# Patient Record
Sex: FEMALE | Race: WHITE | NOT HISPANIC OR LATINO | Employment: FULL TIME | URBAN - METROPOLITAN AREA
[De-identification: names, ages, dates, MRNs, and addresses within clinical notes are randomized per-mention and may not be internally consistent; named-entity substitution may affect disease eponyms.]

---

## 2017-05-05 ENCOUNTER — OFFICE VISIT (OUTPATIENT)
Dept: GYNECOLOGIC ONCOLOGY | Facility: CLINIC | Age: 37
End: 2017-05-05

## 2017-05-05 VITALS
HEART RATE: 50 BPM | RESPIRATION RATE: 16 BRPM | OXYGEN SATURATION: 98 % | WEIGHT: 152 LBS | SYSTOLIC BLOOD PRESSURE: 105 MMHG | DIASTOLIC BLOOD PRESSURE: 60 MMHG | TEMPERATURE: 98.8 F

## 2017-05-05 DIAGNOSIS — Z01.419 WOMEN'S ANNUAL ROUTINE GYNECOLOGICAL EXAMINATION: ICD-10-CM

## 2017-05-05 DIAGNOSIS — Z85.41 HX OF CERVICAL CANCER: Primary | ICD-10-CM

## 2017-05-05 DIAGNOSIS — N95.8 ARTIFICIAL MENOPAUSE: ICD-10-CM

## 2017-05-05 DIAGNOSIS — C53.9 CERVICAL CANCER, FIGO STAGE IA1 (HCC): ICD-10-CM

## 2017-05-05 PROCEDURE — 99214 OFFICE O/P EST MOD 30 MIN: CPT | Performed by: OBSTETRICS & GYNECOLOGY

## 2017-05-11 ENCOUNTER — TELEPHONE (OUTPATIENT)
Dept: GYNECOLOGIC ONCOLOGY | Facility: CLINIC | Age: 37
End: 2017-05-11

## 2017-05-31 ENCOUNTER — TRANSCRIBE ORDERS (OUTPATIENT)
Dept: LAB | Facility: HOSPITAL | Age: 37
End: 2017-05-31

## 2017-05-31 ENCOUNTER — LAB (OUTPATIENT)
Dept: LAB | Facility: HOSPITAL | Age: 37
End: 2017-05-31

## 2017-05-31 DIAGNOSIS — Z01.812 PRE-OPERATIVE LABORATORY EXAMINATION: Primary | ICD-10-CM

## 2017-05-31 DIAGNOSIS — Z01.812 PRE-OPERATIVE LABORATORY EXAMINATION: ICD-10-CM

## 2017-05-31 LAB
ALBUMIN SERPL-MCNC: 4.8 G/DL (ref 3.2–4.8)
ALBUMIN/GLOB SERPL: 1.9 G/DL (ref 1.5–2.5)
ALP SERPL-CCNC: 102 U/L (ref 25–100)
ALT SERPL W P-5'-P-CCNC: 27 U/L (ref 7–40)
ANION GAP SERPL CALCULATED.3IONS-SCNC: 5 MMOL/L (ref 3–11)
AST SERPL-CCNC: 33 U/L (ref 0–33)
BILIRUB SERPL-MCNC: 1.2 MG/DL (ref 0.3–1.2)
BUN BLD-MCNC: 19 MG/DL (ref 9–23)
BUN/CREAT SERPL: 21.1 (ref 7–25)
CALCIUM SPEC-SCNC: 10.3 MG/DL (ref 8.7–10.4)
CHLORIDE SERPL-SCNC: 103 MMOL/L (ref 99–109)
CO2 SERPL-SCNC: 34 MMOL/L (ref 20–31)
CREAT BLD-MCNC: 0.9 MG/DL (ref 0.6–1.3)
DEPRECATED RDW RBC AUTO: 46.7 FL (ref 37–54)
ERYTHROCYTE [DISTWIDTH] IN BLOOD BY AUTOMATED COUNT: 12.7 % (ref 11.3–14.5)
GFR SERPL CREATININE-BSD FRML MDRD: 70 ML/MIN/1.73
GLOBULIN UR ELPH-MCNC: 2.5 GM/DL
GLUCOSE BLD-MCNC: 87 MG/DL (ref 70–100)
HCT VFR BLD AUTO: 41.9 % (ref 34.5–44)
HGB BLD-MCNC: 13.6 G/DL (ref 11.5–15.5)
LIPASE SERPL-CCNC: 51 U/L (ref 6–51)
MCH RBC QN AUTO: 32.5 PG (ref 27–31)
MCHC RBC AUTO-ENTMCNC: 32.5 G/DL (ref 32–36)
MCV RBC AUTO: 100.2 FL (ref 80–99)
PLATELET # BLD AUTO: 166 10*3/MM3 (ref 150–450)
PMV BLD AUTO: 8.9 FL (ref 6–12)
POTASSIUM BLD-SCNC: 3.8 MMOL/L (ref 3.5–5.5)
PROT SERPL-MCNC: 7.3 G/DL (ref 5.7–8.2)
RBC # BLD AUTO: 4.18 10*6/MM3 (ref 3.89–5.14)
SODIUM BLD-SCNC: 142 MMOL/L (ref 132–146)
WBC NRBC COR # BLD: 6.13 10*3/MM3 (ref 3.5–10.8)

## 2017-05-31 PROCEDURE — 83690 ASSAY OF LIPASE: CPT

## 2017-05-31 PROCEDURE — 80053 COMPREHEN METABOLIC PANEL: CPT

## 2017-05-31 PROCEDURE — 36415 COLL VENOUS BLD VENIPUNCTURE: CPT

## 2017-05-31 PROCEDURE — 85027 COMPLETE CBC AUTOMATED: CPT

## 2017-06-05 ENCOUNTER — LAB REQUISITION (OUTPATIENT)
Dept: LAB | Facility: HOSPITAL | Age: 37
End: 2017-06-05

## 2017-06-05 DIAGNOSIS — K80.20 CALCULUS OF GALLBLADDER WITHOUT CHOLECYSTITIS WITHOUT OBSTRUCTION: ICD-10-CM

## 2017-06-05 PROCEDURE — 88304 TISSUE EXAM BY PATHOLOGIST: CPT | Performed by: SURGERY

## 2017-06-13 LAB
CYTO UR: NORMAL
LAB AP CASE REPORT: NORMAL
LAB AP CLINICAL INFORMATION: NORMAL
Lab: NORMAL
PATH REPORT.ADDENDUM SPEC: NORMAL
PATH REPORT.FINAL DX SPEC: NORMAL
PATH REPORT.GROSS SPEC: NORMAL

## 2017-11-08 ENCOUNTER — OFFICE VISIT (OUTPATIENT)
Dept: GYNECOLOGIC ONCOLOGY | Facility: CLINIC | Age: 37
End: 2017-11-08

## 2017-11-08 VITALS
HEART RATE: 57 BPM | SYSTOLIC BLOOD PRESSURE: 123 MMHG | TEMPERATURE: 97.6 F | RESPIRATION RATE: 12 BRPM | WEIGHT: 150 LBS | DIASTOLIC BLOOD PRESSURE: 57 MMHG | OXYGEN SATURATION: 98 %

## 2017-11-08 DIAGNOSIS — Z85.41 HX OF CERVICAL CANCER: ICD-10-CM

## 2017-11-08 DIAGNOSIS — C53.9 CERVICAL CANCER, FIGO STAGE IA1 (HCC): ICD-10-CM

## 2017-11-08 DIAGNOSIS — R87.620 ATYPICAL SQUAMOUS CELL CHANGES OF UNDETERMINED SIGNIFICANCE (ASCUS) ON VAGINAL CYTOLOGY: Primary | ICD-10-CM

## 2017-11-08 PROCEDURE — 99214 OFFICE O/P EST MOD 30 MIN: CPT | Performed by: NURSE PRACTITIONER

## 2017-11-08 RX ORDER — ESTRADIOL 0.5 MG/1
0.5 TABLET ORAL DAILY
Qty: 30 TABLET | Refills: 11 | Status: SHIPPED | OUTPATIENT
Start: 2017-11-08 | End: 2019-03-29 | Stop reason: SDUPTHER

## 2017-11-08 NOTE — PROGRESS NOTES
GYN ONCOLOGY FOLLOW-UP    Ashley Pettit  [id number]  1980    Chief Complaint: Follow-up (with no complaints)        History of present illness:  Ashley Pettit is a 37 y.o. year old female who is here today for follow-up for ASCUS pap smear done 5/2017.  The patient has a history of stage IA endocervical adenocarcinoma status post hysterectomy and bilateral salpingo-oophorectomy completed 12/19/2013.  When she saw Dr. Retana in May she had annual exam with Pap smear that came back ASCUS with negative HPV.  She was very concerned as this is the first abnormal Pap smear she has had since the time of her surgery.  She requested short-term interval follow-up with repeat Pap testing today.  At her visit today she complains of pain and discomfort with intercourse.  She stopped all of her estrogen replacement as she did not feel was significantly helping her symptoms of hot flashes and night sweats.  She has not tried any over-the-counter lubrication with intercourse.  She has no vaginal discharge or odor.  Her bowels and bladder are functioning well.  She has no pelvic pain.  She is interested in discussing the benefit of restarting hormone replacement therapy at this time.    Oncology History:       Cervical cancer, FIGO stage IA1    11/14/2013 Surgery     [facility]: Showed focally invasive endocervical adenocarcinoma with background of AIS.             12/19/2013 Surgery     [facility]: pathology showed stage IA1 adenocarcinoma of cervix.               Past Medical History:   Diagnosis Date   • Anxiety    • Asthma    • Cervical cancer    • GERD (gastroesophageal reflux disease)    • IBS (irritable bowel syndrome)    • Murmur    • Palpitations    • Rheumatic fever    • Sinus problem        Past Surgical History:   Procedure Laterality Date   • LAPAROSCOPIC ASSISTED VAGINAL HYSTERECTOMY SALPINGO OOPHORECTOMY     • LAPAROSCOPIC CHOLECYSTECTOMY     • LEEP         MEDICATIONS: The current medication list was reviewed and  reconciled.     Allergies:  is allergic to bactrim [sulfamethoxazole-trimethoprim] and erythromycin.    Family History   Problem Relation Age of Onset   • Hodgkin's lymphoma Mother      non-   • Lymphoma Mother    • Atrial fibrillation Mother    • Atrial fibrillation Father    • Cancer Paternal Aunt      spleen   • Leukemia Maternal Grandfather    • Melanoma Other        Review of Systems   Constitutional: Negative for appetite change, chills, fatigue, fever and unexpected weight change.   Respiratory: Negative for cough, shortness of breath and wheezing.    Cardiovascular: Negative for chest pain, palpitations and leg swelling.   Gastrointestinal: Negative for abdominal distention, abdominal pain, blood in stool, constipation, diarrhea, nausea and vomiting.   Endocrine: Negative.         Hot flashes   Genitourinary: Positive for dyspareunia. Negative for dysuria, frequency, genital sores, hematuria, pelvic pain, urgency, vaginal bleeding, vaginal discharge and vaginal pain.   Musculoskeletal: Negative for arthralgias, gait problem and joint swelling.   Neurological: Negative for dizziness, seizures, syncope, weakness, light-headedness, numbness and headaches.   Hematological: Negative for adenopathy.   Psychiatric/Behavioral: Negative.        Physical Exam  Vital Signs: /57  Pulse 57  Temp 97.6 °F (36.4 °C) (Temporal Artery )   Resp 12  Wt 150 lb (68 kg)  SpO2 98%   General Appearance:  alert, cooperative, no apparent distress and appears stated age   Neurologic/Psychiatric: A&O x 3, gait steady, appropriate affect   HEENT:  Normocephalic, without obvious abnormality, mucous membranes moist   Neck: Supple, symmetrical, trachea midline, no adenopathy;  No thyromegaly, masses, or tenderness   Back:   Symmetric, no curvature, ROM normal, no CVA tenderness   Lungs:   Clear to auscultation bilaterally; respirations regular, even, and unlabored bilaterally   Heart:  Regular rate and rhythm, no murmurs  appreciated   Breasts:  deferred   Abdomen:   Soft, non-tender, non-distended and no organomegaly   Lymph nodes: No cervical, supraclavicular, inguinal or axillary adenopathy noted   Extremities: Normal, atraumatic; no clubbing, cyanosis, or edema    Pelvic: External Genitalia  without lesions or skin changes  Vagina  is pink, moist, without lesions.   Vaginal Cuff  Female Vaginal Cuff: smooth, intact, without visible lesions and pap obtained  Uterus  surgically absent  Ovaries  surgically absent bilaterallly and without palpable masses or fullness  Parametria  smooth     ECOG Performance Status: 1 - Symptomatic but completely ambulatory    Procedure Notes:  No notes on file    Assessment and Plan:    Ashley was seen today for follow-up.    Diagnoses and all orders for this visit:    Atypical squamous cell changes of undetermined significance (ASCUS) on vaginal cytology  -     Pap IG, Rfx HPV ASCU - ThinPrep Vial, Vagina; Future    Hx of cervical cancer  -     Pap IG, Rfx HPV ASCU - ThinPrep Vial, Vagina; Future    Cervical cancer, FIGO stage IA1    Other orders  -     estradiol (ESTRACE) 0.5 MG tablet; Take 1 tablet by mouth Daily.  -     conjugated estrogens (PREMARIN) 0.625 MG/GM vaginal cream; Insert  into the vagina 2 (Two) Times a Week. At bedtime        The patient was instructed to call the office in 1 week for results of her pap smear.     The patient was instructed to call with vaginal bleeding, discharge, pelvic pain, change in bowel or bladder function, or any new symptoms for evaluation of her complaints.     I reviewed with the patient her Pap smear results that showed ASCUS changes with negative HPV.  We discussed that HPV is really the indicator for potential for dysplastic change.  We discussed that if she develops higher grade abnormalities or begins having positivity and her HPV testing, we will follow her closely with repeat Pap smears as well as colposcopy and biopsy if indicated.  I reassured  her that there are no lesions or changes of the vaginal cuff at this time that are concerning in appearance.    I recommended the patient consider restarting hormone replacement therapy with estrogen.  She had a very early menopause at the age of 33.  We discussed that even though she may not have total control of her hot flashes and night sweats, she would still gain benefit in estrogen replacement to overall bone health as well as vulvovaginal integrity and discomfort with intercourse.  The patient is interested in restarting a low dose today.  I did review with her the risk of hormone replacement therapy including risk for breast cancer, thromboembolic events including heart attack, DVT, and stroke.  The patient is not high risk if she has no significant family history of breast cancer, she is not obese, she is a nonsmoker, and she does not have any underlying health conditions that make estrogen replacement a greater concern.  She is given a prescription for estradiol 0.5 mg tablets to be taken daily, #30 with 5 refills.    We will also start the patient on vaginal estrogen cream 1 g per vagina 2 nights a week to see if this helps improve her symptoms of dyspareunia.  I also recommended she consider use of water-based lubricants at the time of intercourse.  She is given a prescription for Premarin cream at her visit today.    The patient will follow up in 6 months for routine annual exam with repeat Pap smear.  This will be done with BRAD Massey.      Return in about 6 months (around 5/8/2018).      BRAD Simmons      Note: Speech recognition transcription software was used to dictate portions of this document.  An attempt at proofreading has been made though minor errors in transcription may still be present.  Please do not hesitate to call our office with any questions.

## 2017-11-20 ENCOUNTER — TELEPHONE (OUTPATIENT)
Dept: GYNECOLOGIC ONCOLOGY | Facility: CLINIC | Age: 37
End: 2017-11-20

## 2017-11-20 NOTE — TELEPHONE ENCOUNTER
----- Message from BRAD Lopez sent at 11/20/2017  9:40 AM EST -----  Regarding: RE: pap-page  Contact: [phone]  Was HPV positive? If not done please send back to HPV testing. If positive, she needs colpo    ----- Message -----     From: Taryn Ford LPN     Sent: 11/17/2017   2:46 PM       To: BRAD Lopez  Subject: FW: pap-page                                     Yes,  LGSIL  ----- Message -----     From: BRAD Lopez     Sent: 11/17/2017   2:33 PM       To: Taryn Ford LPN  Subject: RE: pap-page                                     Do we have the pap result? If so what is it?   ----- Message -----     From: Taryn Ford LPN     Sent: 11/17/2017  11:31 AM       To: BRAD Lopez  Subject: FW: pap-page                                     What is the plan?  ----- Message -----     From: Tigist Thomson     Sent: 11/16/2017   9:38 AM       To: [facility]  Subject: pap-page                                         Please call with pap results          Phoned [facility],  Spoke with Jono, order for HPV testing added onto specimen from pap and faxed to be done per Mery's order.

## 2017-12-14 ENCOUNTER — TELEPHONE (OUTPATIENT)
Dept: GYNECOLOGIC ONCOLOGY | Facility: CLINIC | Age: 37
End: 2017-12-14

## 2017-12-14 NOTE — TELEPHONE ENCOUNTER
----- Message from Sis Martinez MA sent at 12/14/2017  8:26 AM EST -----    Mery Page : message:    Finally was able to talk with Dr. Retana. He recommended observation with continuation of vaginal estrogen cream. Needs repeat pap with Dilcia in 6 months. If ASCUS- continue obs, if LSIL or higher then plan for colpo. Can you notify pt?   Phoned pt to discuss plan, no answer on mobile, message came on saying mailbox full.  Phoned home and mobile numbers.  Recording came on at both numbers saying  Mail box full, unable to leave message, called her work number, she is not there at present time.

## 2017-12-18 ENCOUNTER — TELEPHONE (OUTPATIENT)
Dept: GYNECOLOGIC ONCOLOGY | Facility: CLINIC | Age: 37
End: 2017-12-18

## 2017-12-19 ENCOUNTER — TELEPHONE (OUTPATIENT)
Dept: GYNECOLOGIC ONCOLOGY | Facility: CLINIC | Age: 37
End: 2017-12-19

## 2017-12-19 NOTE — TELEPHONE ENCOUNTER
----- Message from Sis Martinez MA sent at 12/14/2017  8:26 AM EST -----    Mery Page : message:    Finally was able to talk with Dr. Retana. He recommended observation with continuation of vaginal estrogen cream. Needs repeat pap with Dilcia in 6 months. If ASCUS- continue obs, if LSIL or higher then plan for colpo. Can you notify pt?   Pt phoned office.  Informed her I was not able to reach her due to full mailbox.  Pt v/u, will check on it.  Informed her of above, and repeat pap in 6 months.  Pt has appt already scheduled for 5-2018, will call before if needed.

## 2018-05-08 ENCOUNTER — OFFICE VISIT (OUTPATIENT)
Dept: GYNECOLOGIC ONCOLOGY | Facility: CLINIC | Age: 38
End: 2018-05-08

## 2018-05-08 VITALS
TEMPERATURE: 99 F | HEART RATE: 99 BPM | BODY MASS INDEX: 23.99 KG/M2 | DIASTOLIC BLOOD PRESSURE: 62 MMHG | SYSTOLIC BLOOD PRESSURE: 120 MMHG | WEIGHT: 158.3 LBS | HEIGHT: 68 IN | OXYGEN SATURATION: 96 % | RESPIRATION RATE: 16 BRPM

## 2018-05-08 DIAGNOSIS — C53.9 CERVICAL CANCER, FIGO STAGE IA1 (HCC): ICD-10-CM

## 2018-05-08 DIAGNOSIS — Z01.419 WELL WOMAN EXAM WITH ROUTINE GYNECOLOGICAL EXAM: Primary | ICD-10-CM

## 2018-05-08 PROCEDURE — 99395 PREV VISIT EST AGE 18-39: CPT | Performed by: NURSE PRACTITIONER

## 2018-05-08 NOTE — PROGRESS NOTES
GYN ONCOLOGY ANNUAL WELL WOMAN VISIT      Ashley Pettit  [id number]  1980      Chief Complaint: Gynecologic Exam (wants to discuss hrt options)        History of present illness:  Ashley Pettit is a 37 y.o. year old female who is here today for an annual exam.  The patient has a history of stage IA endocervical adenocarcinoma status post hysterectomy and bilateral salpingo-oophorectomy completed 12/19/2013. She had an ASCUS pap in 5/2017, repeat pap in 11/2017 showed LSIL. Decision was made for observation with continued obs if ASCUS or colpo for LSIL or higher. She is due to repeat pap smear today.   Ashley is feeling generally well today. She denies vaginal bleeding, pelvic pain, concerning lesions, changes in bowel or bladder function, or breast concerns. Her only concern today is regarding her HRT. She states she is having a difficult time finding an HRT form and dose she is happy with. She describes hot flashes and night sweats off of the HRT, but poor sleeping on it. She has tried patches and pills, none of which seem to work ideally for her. She is interested in discussing alternative forms.     Cancer History:      Cervical cancer, FIGO stage IA1    11/14/2013 Surgery     [facility]ep: Showed focally invasive endocervical adenocarcinoma with background of AIS.             12/19/2013 Surgery     [facility], [facility]: pathology showed stage IA1 adenocarcinoma of cervix.               Obstetric History:  OB History     No data available         Menstrual History:     No LMP recorded. She is s/p hysterectomy.           Past Medical History:   Diagnosis Date   • Abnormal Pap smear of cervix    • Anxiety    • Asthma    • Cervical cancer    • GERD (gastroesophageal reflux disease)    • IBS (irritable bowel syndrome)    • Murmur    • Palpitations    • Rheumatic fever    • Sinus problem        Past Surgical History:   Procedure Laterality Date   • BILATERAL SALPINGO OOPHORECTOMY Bilateral    • LAPAROSCOPIC ASSISTED VAGINAL  HYSTERECTOMY SALPINGO OOPHORECTOMY     • LAPAROSCOPIC CHOLECYSTECTOMY     • LEEP         MEDICATIONS: The current medication list was reviewed and reconciled.     Allergies:  is allergic to bactrim [sulfamethoxazole-trimethoprim] and erythromycin.    Family History   Problem Relation Age of Onset   • Hodgkin's lymphoma Mother      non-   • Lymphoma Mother    • Atrial fibrillation Mother    • Heart failure Mother    • Atrial fibrillation Father    • Cancer Paternal Aunt      spleen   • Leukemia Maternal Grandfather    • Melanoma Other        Health Maintenance:  Last pap smear was 11/2017, results were  LGSIL - mild dysplasia..      Review of Systems   Constitutional: Negative for fatigue, fever and unexpected weight change.   HENT: Negative for congestion, ear pain, hearing loss, sinus pressure and trouble swallowing.    Eyes: Negative for visual disturbance.   Respiratory: Negative for cough, chest tightness, shortness of breath and wheezing.    Cardiovascular: Negative for chest pain, palpitations and leg swelling.   Gastrointestinal: Negative for abdominal distention, abdominal pain, constipation, diarrhea, nausea and vomiting.   Endocrine: Negative for cold intolerance, heat intolerance, polydipsia, polyphagia and polyuria.   Genitourinary: Negative for difficulty urinating, dyspareunia, dysuria, frequency, hematuria, pelvic pain, urgency, vaginal bleeding, vaginal discharge and vaginal pain.   Musculoskeletal: Negative for arthralgias, gait problem, joint swelling and myalgias.   Skin: Negative for color change, pallor and rash.   Neurological: Negative for dizziness, seizures, syncope, weakness, light-headedness, numbness and headaches.   Hematological: Negative for adenopathy. Does not bruise/bleed easily.   Psychiatric/Behavioral: Negative for agitation, confusion, sleep disturbance and suicidal ideas. The patient is not nervous/anxious.        Physical Exam  Vital Signs: /62   Pulse 99   Temp 99 °F  "(37.2 °C) (Temporal Artery )   Resp 16   Ht 172.7 cm (68\")   Wt 71.8 kg (158 lb 4.8 oz)   SpO2 96%   BMI 24.07 kg/m²    General Appearance:  alert, cooperative, no apparent distress, appears stated age and normal weight   Neurologic/Psychiatric: A&O x 3, gait steady, appropriate affect   HEENT:  Normocephalic, without obvious abnormality, mucous membranes moist   Neck: Supple, symmetrical, trachea midline, no adenopathy;  No thyromegaly, masses, or tenderness   Back:   Symmetric, no curvature, ROM normal, no CVA tenderness   Lungs:   Clear to auscultation bilaterally; respirations regular, even, and unlabored bilaterally   Heart:  Regular rate and rhythm, no murmurs appreciated   Breasts:  Symmetrical, no masses, no lesions and no nipple discharge   Abdomen:   Soft, non-tender, non-distended and no organomegaly   Lymph nodes: No cervical, supraclavicular, inguinal or axillary adenopathy noted   Extremities: Normal, atraumatic; no clubbing, cyanosis, or edema    Skin: No rashes, ulcers, or suspicious lesions noted   Pelvic: External Genitalia  without lesions or skin changes  Vagina  is pink, moist, without lesions.   Vaginal Cuff  Female Vaginal Cuff: smooth, intact, without visible lesions and pap obtained  Uterus  surgically absent and no palpable masses  Ovaries  surgically absent bilaterallly  Parametria  smooth  Rectovaginal  Female rectovaginal: deferred     ECOG Performance Status: 0 - Asymptomatic    Procedure Note:  No notes on file    Assessment and Plan:    Ashley was seen today for gynecologic exam.    Diagnoses and all orders for this visit:    Well woman exam with routine gynecological exam  -     Pap IG, HPV-hr; Future    Cervical cancer, FIGO stage IA1  -     Pap IG, HPV-hr; Future      There is no evidence of disease upon today's exam. She is understanding to call with any changes in pelvic symptoms or general GYN concerns at any time between regularly scheduled visits.     Pap was done today.  " Call in 1 week for pap smear results. Reviewed plan recommended by Dr. Retana last year. Observation if ASCUS or negative, colpo if persistent LSIL or worse.     She was recommended to begin mammograms at age 40 for breast cancer screening, colonoscopy at age 50 for colon cancer screening and bone density scans (DEXA) at age 60-65 for osteoporosis screening.    We also discussed her HRT history and alternative options. Discussed option of compounded HRT transdermal creams. She is interested in trying. I recommended BiEst 50:50 through [employer]. I will call pharmacy to confirm safe dosing and fax order. We reviewed medication instructions, risks, benefits, and potential side effects.   Encouraged to call with any questions or concerns.     Return in about 6 months (around 11/8/2018) for repeat pap smear.      BRAD Dan      Note: Speech recognition transcription software was used to dictate portions of this document.  An attempt at proofreading has been made though minor errors in transcription may still be present.  Please do not hesitate to call our office with any questions.

## 2018-05-15 ENCOUNTER — TELEPHONE (OUTPATIENT)
Dept: GYNECOLOGIC ONCOLOGY | Facility: CLINIC | Age: 38
End: 2018-05-15

## 2018-05-15 NOTE — TELEPHONE ENCOUNTER
----- Message from Tigist Thomson sent at 5/15/2018 10:36 AM EDT -----  Regarding: pap results  Contact: [phone]  Please call with pap results  Phoned pt, informed her pap not back yet, call back in a couple of days.  Pt v/u, will do as instructed.

## 2018-05-22 ENCOUNTER — TELEPHONE (OUTPATIENT)
Dept: GYNECOLOGIC ONCOLOGY | Facility: CLINIC | Age: 38
End: 2018-05-22

## 2018-05-22 NOTE — TELEPHONE ENCOUNTER
Patient called office for pap smear results. We spoke via phone and reviewed ASCUS with HPV negative. This is improved from LSIL in 11/2017, overall reassuring. Plan to repeat in 6 months. She is pleased to hear this.

## 2019-03-29 ENCOUNTER — OFFICE VISIT (OUTPATIENT)
Dept: GYNECOLOGIC ONCOLOGY | Facility: CLINIC | Age: 39
End: 2019-03-29

## 2019-03-29 VITALS
BODY MASS INDEX: 27.52 KG/M2 | HEART RATE: 56 BPM | RESPIRATION RATE: 12 BRPM | OXYGEN SATURATION: 100 % | DIASTOLIC BLOOD PRESSURE: 56 MMHG | TEMPERATURE: 98.6 F | SYSTOLIC BLOOD PRESSURE: 113 MMHG | WEIGHT: 181 LBS

## 2019-03-29 DIAGNOSIS — E89.41 HOT FLASHES DUE TO SURGICAL MENOPAUSE: ICD-10-CM

## 2019-03-29 DIAGNOSIS — C53.9 CERVICAL CANCER, FIGO STAGE IA1 (HCC): Primary | ICD-10-CM

## 2019-03-29 DIAGNOSIS — F32.A ANXIETY AND DEPRESSION: ICD-10-CM

## 2019-03-29 DIAGNOSIS — F41.9 ANXIETY AND DEPRESSION: ICD-10-CM

## 2019-03-29 PROCEDURE — 99214 OFFICE O/P EST MOD 30 MIN: CPT | Performed by: NURSE PRACTITIONER

## 2019-03-29 RX ORDER — ESTRADIOL 0.5 MG/1
0.5 TABLET ORAL DAILY
Qty: 30 TABLET | Refills: 11 | Status: SHIPPED | OUTPATIENT
Start: 2019-03-29 | End: 2019-12-30 | Stop reason: ALTCHOICE

## 2019-03-29 RX ORDER — VENLAFAXINE HYDROCHLORIDE 37.5 MG/1
37.5 CAPSULE, EXTENDED RELEASE ORAL DAILY
Qty: 30 CAPSULE | Refills: 2 | Status: SHIPPED | OUTPATIENT
Start: 2019-03-29

## 2019-03-29 NOTE — PROGRESS NOTES
GYN ONCOLOGY FOLLOW-UP    Ashley Pettit  [id number]  1980    Chief Complaint: Follow-up (fatigue, etc)        History of present illness:  Ashley Pettit is a 38 y.o. year old female who is here today for repeat pap smear. The patient has a history of stage IA endocervical adenocarcinoma status post hysterectomy and bilateral salpingo-oophorectomy completed 12/19/2013. She had an ASCUS pap in 5/2017, repeat pap in 11/2017 showed LSIL. Last pap smear on 5/8/2018 ASCUS with HPV negative. Decision was made for observation with continued obs if ASCUS or colpo for LSIL or higher. She is due to repeat pap smear today.     Upon arrival she states she is no longer taking her p.o. estradiol.  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 She is enjoying school and performing well, however is experiencing increased stress, fatigue, weight gain, and mild anxiety/depression.  She also describes insomnia and hot flashes greater than 1/h.  She states Dr. Retana previously suggested taking low-dose estradiol along with low-dose Effexor to help manage her symptoms in surgical menopause.  She is interested in trying this now.  She denies SI or desire for self-harm.    Oncology History:       Cervical cancer, FIGO stage IA1 (CMS/HCC)    11/14/2013 Surgery     Leep: Showed focally invasive endocervical adenocarcinoma with background of AIS.             12/19/2013 Surgery     LAVH, BSO: pathology showed stage IA1 adenocarcinoma of cervix.               Past Medical History:   Diagnosis Date   • Abnormal Pap smear of cervix    • Anxiety    • Asthma    • Cervical cancer (CMS/HCC)    • GERD (gastroesophageal reflux disease)    • IBS (irritable bowel syndrome)    • Murmur    • Palpitations    • Rheumatic fever    • Sinus problem        Past Surgical History:   Procedure Laterality Date   • BILATERAL SALPINGO OOPHORECTOMY Bilateral    • LAPAROSCOPIC ASSISTED VAGINAL  HYSTERECTOMY SALPINGO OOPHORECTOMY     • LAPAROSCOPIC CHOLECYSTECTOMY     • LEEP         MEDICATIONS: The current medication list was reviewed and reconciled.     Allergies:  is allergic to bactrim [sulfamethoxazole-trimethoprim] and erythromycin.    Family History   Problem Relation Age of Onset   • Hodgkin's lymphoma Mother         non-   • Lymphoma Mother    • Atrial fibrillation Mother    • Heart failure Mother    • Atrial fibrillation Father    • Cancer Paternal Aunt         spleen   • Leukemia Maternal Grandfather    • Melanoma Other        Review of Systems   Constitutional: Negative for appetite change, chills, fatigue, fever and unexpected weight change.   Respiratory: Negative for cough, shortness of breath and wheezing.    Cardiovascular: Negative for chest pain, palpitations and leg swelling.   Gastrointestinal: Negative for abdominal distention, abdominal pain, blood in stool, constipation, diarrhea, nausea and vomiting.   Endocrine: Negative.    Genitourinary: Negative for dyspareunia, dysuria, frequency, genital sores, hematuria, pelvic pain, urgency, vaginal bleeding, vaginal discharge and vaginal pain.   Musculoskeletal: Negative for arthralgias, gait problem and joint swelling.   Neurological: Negative for dizziness, seizures, syncope, weakness, light-headedness, numbness and headaches.   Hematological: Negative for adenopathy.   Psychiatric/Behavioral: Negative.        Physical Exam  Vital Signs: /56   Pulse 56   Temp 98.6 °F (37 °C) (Temporal)   Resp 12   Wt 82.1 kg (181 lb)   SpO2 100%   BMI 27.52 kg/m²    General Appearance:  alert, cooperative, no apparent distress and appears stated age   Neurologic/Psychiatric: A&O x 3, gait steady, appropriate affect   HEENT:  Normocephalic, without obvious abnormality, mucous membranes moist   Lungs:   Clear to auscultation bilaterally; respirations regular, even, and unlabored bilaterally   Heart:  Regular rate and rhythm, no murmurs  appreciated   Breasts:  deferred   Abdomen:   Soft, non-tender, non-distended and no organomegaly   Lymph nodes: No inguinal adenopathy noted   Extremities: Normal, atraumatic; no clubbing, cyanosis, or edema    Pelvic: External Genitalia  without lesions or skin changes  Vagina  is pink, moist, without lesions.   Vaginal Cuff  Female Vaginal Cuff: smooth, intact, without visible lesions and pap obtained  Uterus  surgically absent and no palpable masses  Ovaries  surgically absent bilaterallly  Parametria  smooth  Rectovaginal  Female rectovaginal: deferred     ECOG Performance Status: 0 - Asymptomatic    Procedure Notes:  No notes on file    Assessment and Plan:    Ashley was seen today for follow-up.    Diagnoses and all orders for this visit:    Cervical cancer, FIGO stage IA1 (CMS/[facility])  -     Pap IG, Rfx HPV ASCU; Future    Anxiety and depression  Hot flashes due to surgical menopause  -     estradiol (ESTRACE) 0.5 MG tablet; Take 1 tablet by mouth Daily.  -     venlafaxine XR (EFFEXOR XR) 37.5 MG 24 hr capsule; Take 1 capsule by mouth Daily.        There is no evidence of disease upon today's exam. She is understanding to call with any changes in pelvic symptoms or general GYN concerns at any time between regularly scheduled visits.   Pap was done today.  If she does not receive the results of the Pap within 2 weeks  time, she was instructed to call to find out the results.  I explained to Ashley that because she is being seen in follow-up of a previously abnormal Pap smear, her next Pap needs to be performed in 6 months.  She will need to be seen roughly every 6 months until 3 consecutive normal Paps have been obtained.  At that point, she can return to routine screening intervals.    Patient and I discussed her recent symptoms and concerns. She states she felt her estradiol was not helpful, but has seen an increase in hot flashes and sleep troubles since discontinuing. She is also having increased stress,  anxiety, and depression as she is trying to continue working and be successful in school. I recommended returning to her HRT and a trial of low-dose Effexor. This is the same regimen recommended to her by Dr. Retana in the past. We reviewed medication instructions, risks, benefits, and potential side effects. She is encouraged to call with any questions, concerns, or new symptoms. We will follow-up in 3 months due to new medications.       Return to clinic in 3 months for Annual exam, ongoing cancer surveillance and medication follow-up.      BRAD Dan      Note: Speech recognition transcription software was used to dictate portions of this document.  An attempt at proofreading has been made though minor errors in transcription may still be present.  Please do not hesitate to call our office with any questions.

## 2019-04-05 ENCOUNTER — TELEPHONE (OUTPATIENT)
Dept: GYNECOLOGIC ONCOLOGY | Facility: CLINIC | Age: 39
End: 2019-04-05

## 2019-06-27 ENCOUNTER — OFFICE VISIT (OUTPATIENT)
Dept: GYNECOLOGIC ONCOLOGY | Facility: CLINIC | Age: 39
End: 2019-06-27

## 2019-06-27 VITALS
HEART RATE: 64 BPM | BODY MASS INDEX: 25.85 KG/M2 | WEIGHT: 170 LBS | SYSTOLIC BLOOD PRESSURE: 126 MMHG | TEMPERATURE: 98 F | OXYGEN SATURATION: 100 % | RESPIRATION RATE: 14 BRPM | DIASTOLIC BLOOD PRESSURE: 59 MMHG

## 2019-06-27 DIAGNOSIS — Z01.419 WELL WOMAN EXAM WITH ROUTINE GYNECOLOGICAL EXAM: Primary | ICD-10-CM

## 2019-06-27 DIAGNOSIS — C53.9 CERVICAL CANCER, FIGO STAGE IA1 (HCC): ICD-10-CM

## 2019-06-27 DIAGNOSIS — Z12.39 SCREENING FOR BREAST CANCER: ICD-10-CM

## 2019-06-27 DIAGNOSIS — K59.00 CONSTIPATION, UNSPECIFIED CONSTIPATION TYPE: ICD-10-CM

## 2019-06-27 PROCEDURE — 99395 PREV VISIT EST AGE 18-39: CPT | Performed by: NURSE PRACTITIONER

## 2019-06-27 RX ORDER — DOCUSATE SODIUM 100 MG/1
100 CAPSULE, LIQUID FILLED ORAL 2 TIMES DAILY
Qty: 60 CAPSULE | Refills: 12 | Status: SHIPPED | OUTPATIENT
Start: 2019-06-27 | End: 2020-06-26

## 2019-06-27 NOTE — PROGRESS NOTES
GYN ONCOLOGY ANNUAL WELL WOMAN VISIT      Ashley Pettit  [id number]  1980      Chief Complaint: Annual Exam (c/o constipation)        History of present illness:  Ashley Pettit is a 38 y.o. year old female who is here today for an annual exam.  The patient has a history of stage IA endocervical adenocarcinoma status post hysterectomy and bilateral salpingo-oophorectomy completed 12/19/2013. She had an ASCUS pap in 5/2017, repeat pap in 11/2017 showed LSIL. Last 2 pap smears on 5/8/2018 and 3/29/2019 ASCUS with HPV negative. Decision was made for observation if ASCUS or colpo for LSIL or higher.    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 She is continuing her Estrace and Effexor-XR as prescribed, feels symptoms are well controlled. Her only complaint today is of constipation over the last 1-2 months. She feels this is stress related and she is working to improve this, but requests additional suggestions for home bowel regimen.   Otherwise, she is feeling generally well. She denies vaginal bleeding, pelvic pain, concerning lesions, back pain, or changes in bladder function. She will be 39 this fall and will be due to begin screening mammograms.        Cancer History:      Cervical cancer, FIGO stage IA1 (CMS/HCC)    11/14/2013 Surgery     Leep: Showed focally invasive endocervical adenocarcinoma with background of AIS.         12/19/2013 Surgery     LAVH, BSO: pathology showed stage IA1 adenocarcinoma of cervix.              Menstrual History:     No LMP recorded. Patient has had a hysterectomy.          Past Medical History:   Diagnosis Date   • Abnormal Pap smear of cervix    • Anxiety    • Asthma    • Cervical cancer (CMS/HCC)    • GERD (gastroesophageal reflux disease)    • IBS (irritable bowel syndrome)    • Murmur    • Palpitations    • Rheumatic fever    • Sinus problem        Past Surgical History:   Procedure Laterality Date   • BILATERAL SALPINGO OOPHORECTOMY Bilateral    •  LAPAROSCOPIC ASSISTED VAGINAL HYSTERECTOMY SALPINGO OOPHORECTOMY     • LAPAROSCOPIC CHOLECYSTECTOMY     • LEEP         MEDICATIONS: The current medication list was reviewed and reconciled.     Allergies:  is allergic to bactrim [sulfamethoxazole-trimethoprim] and erythromycin.    Family History   Problem Relation Age of Onset   • Hodgkin's lymphoma Mother         non-   • Lymphoma Mother    • Atrial fibrillation Mother    • Heart failure Mother    • Atrial fibrillation Father    • Cancer Paternal Aunt         spleen   • Leukemia Maternal Grandfather    • Melanoma Other        Health Maintenance:  Last pap smear was 3/2019, results were  ASCUS with NEGATIVE high risk HPV..      Review of Systems   Constitutional: Negative for fatigue, fever and unexpected weight change.   HENT: Negative for congestion, ear pain, hearing loss, sinus pressure and trouble swallowing.    Eyes: Negative for visual disturbance.   Respiratory: Negative for cough, chest tightness, shortness of breath and wheezing.    Cardiovascular: Negative for chest pain, palpitations and leg swelling.   Gastrointestinal: Positive for constipation. Negative for abdominal distention, abdominal pain, diarrhea, nausea and vomiting.   Endocrine: Negative for cold intolerance, heat intolerance, polydipsia, polyphagia and polyuria.   Genitourinary: Negative for difficulty urinating, dyspareunia, dysuria, frequency, hematuria, pelvic pain, urgency, vaginal bleeding, vaginal discharge and vaginal pain.   Musculoskeletal: Negative for arthralgias, gait problem, joint swelling and myalgias.   Skin: Negative for color change, pallor and rash.   Neurological: Negative for dizziness, seizures, syncope, weakness, light-headedness, numbness and headaches.   Hematological: Negative for adenopathy. Does not bruise/bleed easily.   Psychiatric/Behavioral: Negative for agitation, confusion, sleep disturbance and suicidal ideas. The patient is not nervous/anxious.         Physical Exam  Vital Signs: /59   Pulse 64   Temp 98 °F (36.7 °C)   Resp 14   Wt 77.1 kg (170 lb)   SpO2 100%   BMI 25.85 kg/m²   Pain Score    06/27/19 1012   PainSc: 0-No pain      General Appearance:  alert, cooperative, no apparent distress and appears stated age   Neurologic/Psychiatric: A&O x 3, gait steady, appropriate affect   HEENT:  Normocephalic, without obvious abnormality, mucous membranes moist   Neck: Supple, symmetrical, trachea midline, no adenopathy;  No thyromegaly, masses, or tenderness   Back:   Symmetric, no curvature, ROM normal, no CVA tenderness   Lungs:   Clear to auscultation bilaterally; respirations regular, even, and unlabored bilaterally   Heart:  Regular rate and rhythm, no murmurs appreciated   Breasts:  Symmetrical, no masses, no lesions and no nipple discharge   Abdomen:   Soft, non-tender, non-distended and no organomegaly   Lymph nodes: No cervical, supraclavicular, inguinal or axillary adenopathy noted   Extremities: Normal, atraumatic; no clubbing, cyanosis, or edema    Skin: No rashes, ulcers, or suspicious lesions noted   Pelvic: External Genitalia  without lesions or skin changes  Vagina  is pink, moist, without lesions.   Vaginal Cuff  Female Vaginal Cuff: smooth, intact, without visible lesions and pap obtained  Uterus  surgically absent and no palpable masses  Ovaries  surgically absent bilaterallly  Parametria  smooth  Rectovaginal  Female rectovaginal: deferred     ECOG Performance Status: 0 - Asymptomatic    Procedure Note:  No notes on file    Assessment and Plan:    Ashley was seen today for annual exam.    Diagnoses and all orders for this visit:    Well woman exam with routine gynecological exam    Cervical cancer, FIGO stage IA1 (CMS/[facility])    Screening for breast cancer  -     Mammo Screening Digital Tomosynthesis Bilateral With CAD; Future    Constipation, unspecified constipation type  -     docusate sodium (COLACE) 100 MG capsule; Take 1 capsule  by mouth 2 (Two) Times a Day.        There is no evidence of disease upon today's exam. She is understanding to call with any changes in pelvic symptoms or general GYN concerns at any time between regularly scheduled visits.   Pap was done today.  If she does not receive the results of the Pap within 2 weeks  time, she was instructed to call to find out the results.  I explained to Ashley that because she is being seen in follow-up of a previously abnormal Pap smear, her next Pap needs to be performed in 6 months.  She will need to be seen roughly every 6 months until 3 consecutive normal Paps have been obtained.  At that point, she can return to routine screening intervals.    She was recommended to begin mammograms at age 40 for breast cancer screening, colonoscopy at age 50 for colon cancer screening and bone density scans (DEXA) at age 60-65 for osteoporosis screening.    We discussed recent constipation and improving home bowel regimen. Encouraged good daily water intake, regular exercise, and warm beverages to increase peristalsis. I recommended BID stool softeners and laxatives PRN. If severe constipation or risk for impaction, consider home fleet's enema and mag citrate. Additional resources for constipation treatment given with patient instructions at check-out. She is encouraged to call if these methods are ineffective or she desires referral to GI.       Return to clinic in 6 months for repeat pap smear.      BRAD Dan

## 2019-06-27 NOTE — PATIENT INSTRUCTIONS
Constipation, Adult  Constipation is when a person:  · Poops (has a bowel movement) fewer times in a week than normal.  · Has a hard time pooping.  · Has poop that is dry, hard, or bigger than normal.    Follow these instructions at home:  Eating and drinking    · Eat foods that have a lot of fiber, such as:  ? Fresh fruits and vegetables.  ? Whole grains.  ? Beans.  · Eat less of foods that are high in fat, low in fiber, or overly processed, such as:  ? French fries.  ? Hamburgers.  ? Cookies.  ? Candy.  ? Soda.  · Drink enough fluid to keep your pee (urine) clear or pale yellow.  General instructions  · Exercise regularly or as told by your doctor.  · Go to the restroom when you feel like you need to poop. Do not hold it in.  · Take over-the-counter and prescription medicines only as told by your doctor. These include any fiber supplements.  · Do pelvic floor retraining exercises, such as:  ? Doing deep breathing while relaxing your lower belly (abdomen).  ? Relaxing your pelvic floor while pooping.  · Watch your condition for any changes.  · Keep all follow-up visits as told by your doctor. This is important.  Contact a doctor if:  · You have pain that gets worse.  · You have a fever.  · You have not pooped for 4 days.  · You throw up (vomit).  · You are not hungry.  · You lose weight.  · You are bleeding from the anus.  · You have thin, pencil-like poop (stool).  Get help right away if:  · You have a fever, and your symptoms suddenly get worse.  · You leak poop or have blood in your poop.  · Your belly feels hard or bigger than normal (is bloated).  · You have very bad belly pain.  · You feel dizzy or you faint.  This information is not intended to replace advice given to you by your health care provider. Make sure you discuss any questions you have with your health care provider.  Document Released: 06/05/2009 Document Revised: 07/07/2017 Document Reviewed: 06/07/2017  [employer] Interactive Patient Education ©  2019 [employer]

## 2019-07-12 ENCOUNTER — TELEPHONE (OUTPATIENT)
Dept: GYNECOLOGIC ONCOLOGY | Facility: CLINIC | Age: 39
End: 2019-07-12

## 2019-07-12 NOTE — TELEPHONE ENCOUNTER
----- Message from BRAD Dan sent at 7/12/2019  1:55 PM EDT -----  Please notify pap unchanged. Repeat in 6 months as scheduled. Thanks

## 2019-08-21 ENCOUNTER — APPOINTMENT (OUTPATIENT)
Dept: MAMMOGRAPHY | Facility: HOSPITAL | Age: 39
End: 2019-08-21

## 2019-12-30 ENCOUNTER — OFFICE VISIT (OUTPATIENT)
Dept: GYNECOLOGIC ONCOLOGY | Facility: CLINIC | Age: 39
End: 2019-12-30

## 2019-12-30 VITALS
HEIGHT: 68 IN | DIASTOLIC BLOOD PRESSURE: 72 MMHG | SYSTOLIC BLOOD PRESSURE: 128 MMHG | WEIGHT: 173.5 LBS | OXYGEN SATURATION: 97 % | RESPIRATION RATE: 16 BRPM | TEMPERATURE: 98.1 F | BODY MASS INDEX: 26.3 KG/M2 | HEART RATE: 71 BPM

## 2019-12-30 DIAGNOSIS — Z79.890 HORMONE REPLACEMENT THERAPY (HRT): ICD-10-CM

## 2019-12-30 DIAGNOSIS — Z12.39 BREAST CANCER SCREENING: ICD-10-CM

## 2019-12-30 DIAGNOSIS — C53.9 CERVICAL CANCER, FIGO STAGE IA1 (HCC): Primary | ICD-10-CM

## 2019-12-30 PROCEDURE — 99214 OFFICE O/P EST MOD 30 MIN: CPT | Performed by: NURSE PRACTITIONER

## 2019-12-30 NOTE — PROGRESS NOTES
GYN ONCOLOGY CANCER SURVEILLANCE FOLLOW-UP    Ashley Pettit  [id number]  1980    Chief Complaint: Annual Exam (discuss HRT)        History of present illness:  Ashley Pettit is a 39 y.o. year old female who is here today for ongoing surveillance of Cervical Cancer, see Cancer History. The patient has a history of stage IA endocervical adenocarcinoma status post hysterectomy and bilateral salpingo-oophorectomy completed 12/19/2013. She had an ASCUS pap in 5/2017, repeat pap in 11/2017 showed LSIL. Last 3 pap smears on 5/8/2018, 3/29/2019, and 6/27/2019 ASCUS with HPV negative. Decision was made for observation if ASCUS or colpo for LSIL or higher.      She reports she is feeling generally well today. She denies vaginal bleeding, pelvic pain, changes in bowel or bladder function, new or concerning lesions, and breast problems. She has ongoing hot flashes on her low-dose Estrace PO, but did not feel well on higher doses. She is interested in trying an alternate brand, desires PO Premarin. 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 She is now 39 and needs an initial screening mammogram.        Cancer History:      Cervical cancer, FIGO stage IA1 (CMS/HCC)    11/14/2013 Surgery     Leep: Showed focally invasive endocervical adenocarcinoma with background of AIS.      12/19/2013 Surgery     LAVH, BSO: pathology showed stage IA1 adenocarcinoma of cervix.          Past Medical History:   Diagnosis Date   • Abnormal Pap smear of cervix    • Anxiety    • Asthma    • Cervical cancer (CMS/HCC)    • GERD (gastroesophageal reflux disease)    • IBS (irritable bowel syndrome)    • Murmur    • Palpitations    • Rheumatic fever    • Sinus problem        Past Surgical History:   Procedure Laterality Date   • BILATERAL SALPINGO OOPHORECTOMY Bilateral    • LAPAROSCOPIC ASSISTED VAGINAL HYSTERECTOMY SALPINGO OOPHORECTOMY     • LAPAROSCOPIC CHOLECYSTECTOMY     • LEEP    "      MEDICATIONS: The current medication list was reviewed and reconciled.     Allergies:  is allergic to bactrim [sulfamethoxazole-trimethoprim] and erythromycin.    Family History   Problem Relation Age of Onset   • Hodgkin's lymphoma Mother         non-   • Lymphoma Mother    • Atrial fibrillation Mother    • Heart failure Mother    • Atrial fibrillation Father    • Cancer Paternal Aunt         spleen   • Leukemia Maternal Grandfather    • Melanoma Other        Review of Systems   Constitutional: Negative for appetite change, chills, fatigue, fever and unexpected weight change.   Respiratory: Negative for cough, shortness of breath and wheezing.    Cardiovascular: Negative for chest pain, palpitations and leg swelling.   Gastrointestinal: Negative for abdominal distention, abdominal pain, blood in stool, constipation, diarrhea, nausea and vomiting.   Endocrine: Positive for heat intolerance (hot flashes).   Genitourinary: Negative for dyspareunia, dysuria, frequency, genital sores, hematuria, pelvic pain, urgency, vaginal bleeding, vaginal discharge and vaginal pain.   Musculoskeletal: Negative for arthralgias, gait problem and joint swelling.   Neurological: Negative for dizziness, seizures, syncope, weakness, light-headedness, numbness and headaches.   Hematological: Negative for adenopathy.   Psychiatric/Behavioral: Negative.        Physical Exam  Vital Signs: /72   Pulse 71   Temp 98.1 °F (36.7 °C) (Oral)   Resp 16   Ht 172.7 cm (68\")   Wt 78.7 kg (173 lb 8 oz)   SpO2 97%   BMI 26.38 kg/m²   Pain Score    12/30/19 0926   PainSc: 0-No pain      General Appearance:  alert, cooperative, no apparent distress and appears stated age   Neurologic/Psychiatric: A&O x 3, gait steady, appropriate affect   HEENT:  Normocephalic, without obvious abnormality, mucous membranes moist   Lungs:   Clear to auscultation bilaterally; respirations regular, even, and unlabored bilaterally   Heart:  Regular rate and " rhythm, no murmurs appreciated   Breasts:  Symmetrical, no masses, no lesions and no nipple discharge   Abdomen:   Soft, non-tender, non-distended and no organomegaly   Lymph nodes: No cervical, supraclavicular, inguinal or axillary adenopathy noted   Extremities: Normal, atraumatic; no clubbing, cyanosis, or edema    Pelvic: External Genitalia  without lesions or skin changes  Vagina  is pink, moist, without lesions.   Vaginal Cuff  Female Vaginal Cuff: smooth, intact, without visible lesions and pap obtained  Uterus  surgically absent and no palpable masses  Ovaries  surgically absent bilaterally  Parametria  smooth  Rectovaginal  Female rectovaginal: deferred     ECOG Performance Status: 0 - Asymptomatic    Procedure Note:  No notes on file      Assessment and Plan:  Ashley was seen today for annual exam.    Diagnoses and all orders for this visit:    Cervical cancer, FIGO stage IA1 (CMS/[facility])  -     Pap IG, Rfx HPV ASCU; Future    Breast cancer screening  -     Mammo Screening Digital Tomosynthesis Bilateral With CAD; Future    Hormone replacement therapy (HRT)  -     estrogens, conjugated, (PREMARIN) 0.9 MG tablet; Take 1 tablet by mouth Daily.      There is no evidence of disease upon today's exam. She is understanding to call with any changes in pelvic symptoms or general GYN concerns at any time between regularly scheduled visits.     Pap was done today.  If she does not receive the results of the Pap within 2 weeks  time, she was instructed to call to find out the results.  I explained to Ashley that because she is being seen in follow-up of a previously abnormal Pap smear, her next Pap needs to be performed in 6 months.  She will need to be seen roughly every 6 months until 3 consecutive normal Paps have been obtained.  At that point, she can return to routine screening intervals.    Initial screening mammogram ordered, patient prefers to have same day as her annual in Summer 2020.     The patient was  counseled regarding the risks and benefits of hormone replacement therapy. Risks addressed included increased risk for breast cancer, including the need for regular breast screening with mammograms, as well as risks for thromboembolic events including DVT, PE, MI, or CVA. Signs and symptoms of these conditions were reviewed with instruction on when to seek medical attention. Benefits addressed included improved bone density, management of vasomotor symptoms associated with menopause, and maintenance of vulvovaginal mucosa. The patient verbalized understanding of risks, all of her questions were answered, and she desires to proceed with usage as prescribed. We will change to PO Premarin at 0.9 mg daily. New Rx sent to pharmacy. She was instructed to call with any further questions or concerns related to hormone replacement use.        Return to clinic in 6 months for Annual exam and repeat pap smear.      BRAD Dan

## 2020-01-10 ENCOUNTER — TELEPHONE (OUTPATIENT)
Dept: GYNECOLOGIC ONCOLOGY | Facility: CLINIC | Age: 40
End: 2020-01-10

## 2020-01-10 NOTE — TELEPHONE ENCOUNTER
I called and informed pt of negative pap smear results and mailed her a copy, per her results. She verbalized understanding.

## 2020-02-10 ENCOUNTER — TELEPHONE (OUTPATIENT)
Dept: GYNECOLOGIC ONCOLOGY | Facility: CLINIC | Age: 40
End: 2020-02-10

## 2020-02-10 NOTE — TELEPHONE ENCOUNTER
Patients insurance doesn't want to pay for premarin 0.9 mg needs a P.A. On this     [facility] [phone]    Patient call back number [phone]

## 2020-03-11 ENCOUNTER — TELEPHONE (OUTPATIENT)
Dept: ONCOLOGY | Facility: CLINIC | Age: 40
End: 2020-03-11

## 2020-03-11 NOTE — TELEPHONE ENCOUNTER
PT CALLED REGARDING HER PREMARIN 0.9 MG. SHE CALLED THE OFFICE IN FEB NEEDING PROVIDER AUTHORIZATION. HAS NOT HEARD FROM ANYONE.    PLEASE GIVE PT A CALL [phone].

## 2020-03-12 RX ORDER — ESTRADIOL 0.05 MG/D
1 PATCH TRANSDERMAL WEEKLY
Qty: 4 PATCH | Refills: 5 | Status: SHIPPED | OUTPATIENT
Start: 2020-03-12

## 2020-03-12 NOTE — TELEPHONE ENCOUNTER
Medication was denied. Patient has not tried and failed 2 formulary medications.     Message sent to APRN. To see what else should be sent.

## 2020-06-26 ENCOUNTER — OFFICE VISIT (OUTPATIENT)
Dept: GYNECOLOGIC ONCOLOGY | Facility: CLINIC | Age: 40
End: 2020-06-26

## 2020-06-26 ENCOUNTER — APPOINTMENT (OUTPATIENT)
Dept: MAMMOGRAPHY | Facility: HOSPITAL | Age: 40
End: 2020-06-26

## 2020-06-26 VITALS
BODY MASS INDEX: 25.7 KG/M2 | SYSTOLIC BLOOD PRESSURE: 116 MMHG | WEIGHT: 169 LBS | RESPIRATION RATE: 14 BRPM | HEART RATE: 65 BPM | TEMPERATURE: 98.2 F | OXYGEN SATURATION: 98 % | DIASTOLIC BLOOD PRESSURE: 61 MMHG

## 2020-06-26 DIAGNOSIS — N94.10 DYSPAREUNIA, FEMALE: ICD-10-CM

## 2020-06-26 DIAGNOSIS — N39.3 SUI (STRESS URINARY INCONTINENCE, FEMALE): ICD-10-CM

## 2020-06-26 DIAGNOSIS — C53.9 CERVICAL CANCER, FIGO STAGE IA1 (HCC): ICD-10-CM

## 2020-06-26 DIAGNOSIS — Z01.419 WELL WOMAN EXAM WITH ROUTINE GYNECOLOGICAL EXAM: Primary | ICD-10-CM

## 2020-06-26 PROCEDURE — 99396 PREV VISIT EST AGE 40-64: CPT | Performed by: NURSE PRACTITIONER

## 2020-06-26 RX ORDER — LORATADINE 10 MG/1
10 TABLET ORAL DAILY
COMMUNITY
Start: 2020-06-04

## 2023-03-20 ENCOUNTER — TELEPHONE (OUTPATIENT)
Dept: GYNECOLOGIC ONCOLOGY | Facility: CLINIC | Age: 43
End: 2023-03-20

## 2023-03-20 NOTE — TELEPHONE ENCOUNTER
Caller: Natalie Pettit    Relationship: Self    Best call back number: [phone]    What is the best time to reach you: ANY    Who are you requesting to speak with (clinical staff, provider,  specific staff member): CLINICAL    What was the call regarding: NATALIE WAS LAST SEEN 6-2020.  SHE HAS INSURANCE NOW AND WOULD LIKE TO RESUME HER CARE AND NEEDS A FOLLOW UP APPT    Do you require a callback: YES

## 2023-03-20 NOTE — TELEPHONE ENCOUNTER
Spoke with patient. Informed that we only see patients within a 5 year diagnosis and that she can follow up with routine GYN. Pt v/u

## 2023-03-20 NOTE — TELEPHONE ENCOUNTER
She's 10 years out and it was early cervical cancer. I think routine GYN is fine. She needs yearly annual + pap. Just remind her if they find problems that we will be available. Thanks!